# Patient Record
Sex: FEMALE | Race: WHITE | NOT HISPANIC OR LATINO | URBAN - METROPOLITAN AREA
[De-identification: names, ages, dates, MRNs, and addresses within clinical notes are randomized per-mention and may not be internally consistent; named-entity substitution may affect disease eponyms.]

---

## 2017-03-10 ENCOUNTER — OUTPATIENT (OUTPATIENT)
Dept: OUTPATIENT SERVICES | Facility: HOSPITAL | Age: 67
LOS: 1 days | Discharge: HOME | End: 2017-03-10

## 2017-06-27 DIAGNOSIS — Z12.31 ENCOUNTER FOR SCREENING MAMMOGRAM FOR MALIGNANT NEOPLASM OF BREAST: ICD-10-CM

## 2018-06-20 ENCOUNTER — OUTPATIENT (OUTPATIENT)
Dept: OUTPATIENT SERVICES | Facility: HOSPITAL | Age: 68
LOS: 1 days | Discharge: HOME | End: 2018-06-20

## 2018-06-20 DIAGNOSIS — Z12.31 ENCOUNTER FOR SCREENING MAMMOGRAM FOR MALIGNANT NEOPLASM OF BREAST: ICD-10-CM

## 2018-06-21 DIAGNOSIS — Z13.820 ENCOUNTER FOR SCREENING FOR OSTEOPOROSIS: ICD-10-CM

## 2018-06-21 DIAGNOSIS — Z82.62 FAMILY HISTORY OF OSTEOPOROSIS: ICD-10-CM

## 2018-06-21 DIAGNOSIS — N95.9 UNSPECIFIED MENOPAUSAL AND PERIMENOPAUSAL DISORDER: ICD-10-CM

## 2019-06-24 ENCOUNTER — OUTPATIENT (OUTPATIENT)
Dept: OUTPATIENT SERVICES | Facility: HOSPITAL | Age: 69
LOS: 1 days | Discharge: HOME | End: 2019-06-24
Payer: MEDICARE

## 2019-06-24 DIAGNOSIS — Z12.31 ENCOUNTER FOR SCREENING MAMMOGRAM FOR MALIGNANT NEOPLASM OF BREAST: ICD-10-CM

## 2019-06-24 PROCEDURE — 77067 SCR MAMMO BI INCL CAD: CPT | Mod: 26

## 2019-06-24 PROCEDURE — 77063 BREAST TOMOSYNTHESIS BI: CPT | Mod: 26

## 2020-11-18 ENCOUNTER — OUTPATIENT (OUTPATIENT)
Dept: OUTPATIENT SERVICES | Facility: HOSPITAL | Age: 70
LOS: 1 days | Discharge: HOME | End: 2020-11-18
Payer: MEDICARE

## 2020-11-18 DIAGNOSIS — Z12.31 ENCOUNTER FOR SCREENING MAMMOGRAM FOR MALIGNANT NEOPLASM OF BREAST: ICD-10-CM

## 2020-11-18 PROCEDURE — 77067 SCR MAMMO BI INCL CAD: CPT | Mod: 26

## 2020-11-18 PROCEDURE — 77063 BREAST TOMOSYNTHESIS BI: CPT | Mod: 26

## 2021-06-21 ENCOUNTER — INPATIENT (INPATIENT)
Facility: HOSPITAL | Age: 71
LOS: 1 days | Discharge: HOME | End: 2021-06-23
Attending: INTERNAL MEDICINE | Admitting: INTERNAL MEDICINE
Payer: MEDICARE

## 2021-06-21 VITALS
SYSTOLIC BLOOD PRESSURE: 161 MMHG | TEMPERATURE: 97 F | DIASTOLIC BLOOD PRESSURE: 87 MMHG | OXYGEN SATURATION: 97 % | RESPIRATION RATE: 18 BRPM | HEART RATE: 60 BPM

## 2021-06-21 LAB
ALBUMIN SERPL ELPH-MCNC: 4.3 G/DL — SIGNIFICANT CHANGE UP (ref 3.5–5.2)
ALP SERPL-CCNC: 95 U/L — SIGNIFICANT CHANGE UP (ref 30–115)
ALT FLD-CCNC: 18 U/L — SIGNIFICANT CHANGE UP (ref 0–41)
ANION GAP SERPL CALC-SCNC: 10 MMOL/L — SIGNIFICANT CHANGE UP (ref 7–14)
APTT BLD: 30 SEC — SIGNIFICANT CHANGE UP (ref 27–39.2)
AST SERPL-CCNC: 20 U/L — SIGNIFICANT CHANGE UP (ref 0–41)
BASOPHILS # BLD AUTO: 0.04 K/UL — SIGNIFICANT CHANGE UP (ref 0–0.2)
BASOPHILS NFR BLD AUTO: 0.4 % — SIGNIFICANT CHANGE UP (ref 0–1)
BILIRUB SERPL-MCNC: 0.5 MG/DL — SIGNIFICANT CHANGE UP (ref 0.2–1.2)
BUN SERPL-MCNC: 12 MG/DL — SIGNIFICANT CHANGE UP (ref 10–20)
CALCIUM SERPL-MCNC: 9.1 MG/DL — SIGNIFICANT CHANGE UP (ref 8.5–10.1)
CHLORIDE SERPL-SCNC: 103 MMOL/L — SIGNIFICANT CHANGE UP (ref 98–110)
CO2 SERPL-SCNC: 24 MMOL/L — SIGNIFICANT CHANGE UP (ref 17–32)
CREAT SERPL-MCNC: 0.6 MG/DL — LOW (ref 0.7–1.5)
EOSINOPHIL # BLD AUTO: 0.11 K/UL — SIGNIFICANT CHANGE UP (ref 0–0.7)
EOSINOPHIL NFR BLD AUTO: 1.1 % — SIGNIFICANT CHANGE UP (ref 0–8)
GLUCOSE SERPL-MCNC: 105 MG/DL — HIGH (ref 70–99)
HCT VFR BLD CALC: 41.3 % — SIGNIFICANT CHANGE UP (ref 37–47)
HGB BLD-MCNC: 13.7 G/DL — SIGNIFICANT CHANGE UP (ref 12–16)
IMM GRANULOCYTES NFR BLD AUTO: 0.7 % — HIGH (ref 0.1–0.3)
INR BLD: 0.96 RATIO — SIGNIFICANT CHANGE UP (ref 0.65–1.3)
LYMPHOCYTES # BLD AUTO: 1.74 K/UL — SIGNIFICANT CHANGE UP (ref 1.2–3.4)
LYMPHOCYTES # BLD AUTO: 17.2 % — LOW (ref 20.5–51.1)
MCHC RBC-ENTMCNC: 31.1 PG — HIGH (ref 27–31)
MCHC RBC-ENTMCNC: 33.2 G/DL — SIGNIFICANT CHANGE UP (ref 32–37)
MCV RBC AUTO: 93.7 FL — SIGNIFICANT CHANGE UP (ref 81–99)
MONOCYTES # BLD AUTO: 0.75 K/UL — HIGH (ref 0.1–0.6)
MONOCYTES NFR BLD AUTO: 7.4 % — SIGNIFICANT CHANGE UP (ref 1.7–9.3)
NEUTROPHILS # BLD AUTO: 7.42 K/UL — HIGH (ref 1.4–6.5)
NEUTROPHILS NFR BLD AUTO: 73.2 % — SIGNIFICANT CHANGE UP (ref 42.2–75.2)
NRBC # BLD: 0 /100 WBCS — SIGNIFICANT CHANGE UP (ref 0–0)
NT-PROBNP SERPL-SCNC: 43 PG/ML — SIGNIFICANT CHANGE UP (ref 0–300)
PLATELET # BLD AUTO: 349 K/UL — SIGNIFICANT CHANGE UP (ref 130–400)
POTASSIUM SERPL-MCNC: 3.9 MMOL/L — SIGNIFICANT CHANGE UP (ref 3.5–5)
POTASSIUM SERPL-SCNC: 3.9 MMOL/L — SIGNIFICANT CHANGE UP (ref 3.5–5)
PROT SERPL-MCNC: 6.7 G/DL — SIGNIFICANT CHANGE UP (ref 6–8)
PROTHROM AB SERPL-ACNC: 11 SEC — SIGNIFICANT CHANGE UP (ref 9.95–12.87)
RBC # BLD: 4.41 M/UL — SIGNIFICANT CHANGE UP (ref 4.2–5.4)
RBC # FLD: 13.1 % — SIGNIFICANT CHANGE UP (ref 11.5–14.5)
SARS-COV-2 RNA SPEC QL NAA+PROBE: SIGNIFICANT CHANGE UP
SODIUM SERPL-SCNC: 137 MMOL/L — SIGNIFICANT CHANGE UP (ref 135–146)
TROPONIN T SERPL-MCNC: <0.01 NG/ML — SIGNIFICANT CHANGE UP
WBC # BLD: 10.13 K/UL — SIGNIFICANT CHANGE UP (ref 4.8–10.8)
WBC # FLD AUTO: 10.13 K/UL — SIGNIFICANT CHANGE UP (ref 4.8–10.8)

## 2021-06-21 PROCEDURE — 99223 1ST HOSP IP/OBS HIGH 75: CPT

## 2021-06-21 PROCEDURE — 93010 ELECTROCARDIOGRAM REPORT: CPT

## 2021-06-21 PROCEDURE — 99285 EMERGENCY DEPT VISIT HI MDM: CPT

## 2021-06-21 PROCEDURE — 70496 CT ANGIOGRAPHY HEAD: CPT | Mod: 26,MA

## 2021-06-21 PROCEDURE — 99222 1ST HOSP IP/OBS MODERATE 55: CPT

## 2021-06-21 PROCEDURE — 71045 X-RAY EXAM CHEST 1 VIEW: CPT | Mod: 26

## 2021-06-21 PROCEDURE — 70450 CT HEAD/BRAIN W/O DYE: CPT | Mod: 26,MA,59

## 2021-06-21 PROCEDURE — 70498 CT ANGIOGRAPHY NECK: CPT | Mod: 26,MA

## 2021-06-21 RX ORDER — ASPIRIN/CALCIUM CARB/MAGNESIUM 324 MG
81 TABLET ORAL DAILY
Refills: 0 | Status: DISCONTINUED | OUTPATIENT
Start: 2021-06-21 | End: 2021-06-23

## 2021-06-21 RX ORDER — ONDANSETRON 8 MG/1
4 TABLET, FILM COATED ORAL ONCE
Refills: 0 | Status: COMPLETED | OUTPATIENT
Start: 2021-06-21 | End: 2021-06-21

## 2021-06-21 RX ORDER — ATORVASTATIN CALCIUM 80 MG/1
80 TABLET, FILM COATED ORAL AT BEDTIME
Refills: 0 | Status: DISCONTINUED | OUTPATIENT
Start: 2021-06-21 | End: 2021-06-23

## 2021-06-21 RX ORDER — ASPIRIN/CALCIUM CARB/MAGNESIUM 324 MG
325 TABLET ORAL ONCE
Refills: 0 | Status: COMPLETED | OUTPATIENT
Start: 2021-06-21 | End: 2021-06-21

## 2021-06-21 RX ORDER — MECLIZINE HCL 12.5 MG
25 TABLET ORAL EVERY 8 HOURS
Refills: 0 | Status: DISCONTINUED | OUTPATIENT
Start: 2021-06-21 | End: 2021-06-23

## 2021-06-21 RX ORDER — ENOXAPARIN SODIUM 100 MG/ML
40 INJECTION SUBCUTANEOUS DAILY
Refills: 0 | Status: DISCONTINUED | OUTPATIENT
Start: 2021-06-21 | End: 2021-06-23

## 2021-06-21 RX ORDER — CHLORHEXIDINE GLUCONATE 213 G/1000ML
1 SOLUTION TOPICAL
Refills: 0 | Status: DISCONTINUED | OUTPATIENT
Start: 2021-06-21 | End: 2021-06-23

## 2021-06-21 RX ORDER — MECLIZINE HCL 12.5 MG
25 TABLET ORAL ONCE
Refills: 0 | Status: COMPLETED | OUTPATIENT
Start: 2021-06-21 | End: 2021-06-21

## 2021-06-21 RX ADMIN — Medication 25 MILLIGRAM(S): at 22:21

## 2021-06-21 RX ADMIN — ONDANSETRON 4 MILLIGRAM(S): 8 TABLET, FILM COATED ORAL at 11:36

## 2021-06-21 RX ADMIN — Medication 325 MILLIGRAM(S): at 12:28

## 2021-06-21 RX ADMIN — Medication 25 MILLIGRAM(S): at 12:28

## 2021-06-21 RX ADMIN — ENOXAPARIN SODIUM 40 MILLIGRAM(S): 100 INJECTION SUBCUTANEOUS at 22:21

## 2021-06-21 NOTE — ED PROVIDER NOTE - CLINICAL SUMMARY MEDICAL DECISION MAKING FREE TEXT BOX
Patient presents with room spinning dizziness and nausea. labs, ekg, cxr, ct, cta done. Symptoms improved while in the ED. CTA shows possible occlusion versus artifact, results discussed with patient. Seen by neurology who recommends stroke unit admission. Patient given meclizine and asprin. Admitted for further management.

## 2021-06-21 NOTE — ED PROVIDER NOTE - PHYSICAL EXAMINATION
CONSTITUTIONAL: Well-developed; well-nourished; in no acute distress.   SKIN: warm, dry  HEAD: Normocephalic; atraumatic.  EYES: PERRL, EOMI, no conjunctival erythema  ENT: No nasal discharge; airway clear.  NECK: Supple; non tender.  CARD: S1, S2 normal;  Regular rate and rhythm.   RESP: No wheezes, rales or rhonchi.  ABD: soft non tender, non distended, no rebound or guarding  EXT: Normal ROM.  5/5 strength in all 4 extremities   LYMPH: No acute cervical adenopathy.  NEURO: A&Ox3, CN 2-11 intact, moving all extremities, 5/5 strength, equal sensation bilaterally, normal finger to nose exam.   PSYCH: Cooperative, appropriate.

## 2021-06-21 NOTE — ED PROVIDER NOTE - PROGRESS NOTE DETAILS
Discussed with yash from neurology, will come to evaluate the patient Patient seen by neurology, recommending CTA. patient feeling better, dizziness improved. Able to walk to the bathroom without support.

## 2021-06-21 NOTE — H&P ADULT - ATTENDING COMMENTS
71 YO F with a PMH of HLD who was BIBEMS for eval of dizziness (room-spinning) that started upon waking this AM, lasting for 15 minutes. Associated with left-sided weakness/numbness. The pt denies any visual changes, or headache. ROS negative, except as stated above.     In the ED, CTH was negative for acute changes. CTP/CTA showed possible occlusion of V2 segment of vertebral artery vs artifact. No tPA administered due to pt being out of the window. Neuro consulted and recommended possible MRI, echo, secondary stroke PPX (ASA/Statin), and neurochecks.     FMHx: Reviewed, not relevant    Physical exam showed pt in NAD. VSS, afebrile, not hypoxic on RA. A&Ox3. 5/5 strength in RUE/RLE/LUE/LLE, otherwise negative. No sensation changes. CNs are intact. No dysarthria. CTA B/L. RRR, no M/G/R. ABD is soft and non-tender. LEs without swelling. Labs and radiology as above.     Dizziness + Left-sided weakness/numbness, suspect TIA. Neuro is following. Secondary stroke PPX (ASA/Statin). MRI/A. Echo. A1c, lipids, and TSH. PT/ consult. Fall precautions.     Hx of HLD. Restart home meds, except as stated above. DVT PPX. Inform PCP of pt's admission to hospital. My note supersedes the residents note.

## 2021-06-21 NOTE — ED PROVIDER NOTE - OBJECTIVE STATEMENT
71 yo F pmh of HLD presents with dizziness. States that this morning she woke up feeling off, reports having cold sweats. Was able to walk to the bathroom. Afterwards states that she felt worse, had nausea and room spinning dizziness. Also describes that he left arm felt tingling, no weakness or numbness. no headache, was able to walk but felt unsteady. no chest pain, no shortness of breath, no palpitations. no similar episodes in the past.

## 2021-06-21 NOTE — H&P ADULT - ASSESSMENT
#Dizziness, lightheadedness, left sided weakness/numbness  - CTH no acute pathology  - CT angio head and neck shows possible occlusion in V2 segment in the right vertebral artery  - obtain MRI Head and MRA of the neck  - Aspirin and statin  - Check LDL, hgba1c  - ECHO  - Meclizine PRN

## 2021-06-21 NOTE — H&P ADULT - HISTORY OF PRESENT ILLNESS
Ms. Montalvo is a 71yo woman with PMHX below presenting with vertigo upon awakening this morning.  She had severe nausea and vomiting and also a transient episode of left arm and leg tingling and difficulty speaking last about 10-15 minutes.  She feels slihghtly better since coming to ED but has not moved since laying in bed.  No previous episodes similar to this.  NO hearing loss or tinnitus. No change in medications.  No diplopia, dysarhtia or dysphagia 69 y/o F w/ PMHx of HLD presents with dizziness, left sided numbness and weakness. Patient woke up this morning with dizziness and sensation of room spinning. She tried to get up and walk to the washroom however she was unable to stand properly. She called her  and was trying to talk to him however reported having difficulty with speech. Additionally she endorsed left sided numbness/tingling along with weakness. Symptoms lasted roughly 15 minutes and currently in the ED she reports feeling back to normal.

## 2021-06-21 NOTE — ED PROVIDER NOTE - NS ED ROS FT
Eyes:  No visual changes, eye pain or discharge.  ENMT:  No hearing changes, pain, no sore throat or runny nose, no difficulty swallowing  Cardiac:  No chest pain, SOB or edema. No chest pain with exertion.  Respiratory:  No cough or respiratory distress.    GI:  + nausea no vomiting, diarrhea or abdominal pain.  :  No dysuria, frequency or burning.  MS:  No myalgia, muscle weakness, joint pain or back pain.  Neuro:  No headache or weakness.  No LOC. + room spinning dizziness, Episode of left arm and left tingling sensation this morning that has resolved. no weakness, no numbness.   Skin:  No skin rash.   Endocrine: No history of thyroid disease or diabetes.

## 2021-06-21 NOTE — H&P ADULT - NSHPLABSRESULTS_GEN_ALL_CORE
13.7   10.13 )-----------( 349      ( 21 Jun 2021 10:09 )             41.3       06-21    137  |  103  |  12  ----------------------------<  105<H>  3.9   |  24  |  0.6<L>    Ca    9.1      21 Jun 2021 10:09    TPro  6.7  /  Alb  4.3  /  TBili  0.5  /  DBili  x   /  AST  20  /  ALT  18  /  AlkPhos  95  06-21                  PT/INR - ( 21 Jun 2021 10:09 )   PT: 11.00 sec;   INR: 0.96 ratio         PTT - ( 21 Jun 2021 10:09 )  PTT:30.0 sec    Lactate Trend      CARDIAC MARKERS ( 21 Jun 2021 10:09 )  x     / <0.01 ng/mL / x     / x     / x            CAPILLARY BLOOD GLUCOSE      POCT Blood Glucose.: 117 mg/dL (21 Jun 2021 09:20)        < from: CT Angio Head w/ IV Cont (06.21.21 @ 13:09) >    IMPRESSION:    Focal loss of vascular enhancements in the V2 segment of the right vertebral artery (at the level of C3) could be artifactual from streak artifact, however focal occlusion cannot be completely excluded. Further evaluation with MRA of the neck can be obtained as indicated.    No carotid stenosis or occlusion.    < end of copied text >    < from: CT Head No Cont (06.21.21 @ 10:42) >    IMPRESSION:    No CT evidence of acute intracranial pathology.    < end of copied text >

## 2021-06-21 NOTE — ED ADULT NURSE NOTE - NSIMPLEMENTINTERV_GEN_ALL_ED
Implemented All Fall Risk Interventions:  [place] to call system. Call bell, personal items and telephone within reach. Instruct patient to call for assistance. Room bathroom lighting operational. Non-slip footwear when patient is off stretcher. Physically safe environment: no spills, clutter or unnecessary equipment. Stretcher in lowest position, wheels locked, appropriate side rails in place. Provide visual cue, wrist band, yellow gown, etc. Monitor gait and stability. Monitor for mental status changes and reorient to person, place, and time. Review medications for side effects contributing to fall risk. Reinforce activity limits and safety measures with patient and family.

## 2021-06-21 NOTE — ED ADULT NURSE NOTE - OBJECTIVE STATEMENT
Pt had 1 episode of n/v at home then c/o dizziness. Pt states "room was spinning" and she had to lower herself to grown.

## 2021-06-22 LAB
A1C WITH ESTIMATED AVERAGE GLUCOSE RESULT: 5.2 % — SIGNIFICANT CHANGE UP (ref 4–5.6)
CHOLEST SERPL-MCNC: 164 MG/DL — SIGNIFICANT CHANGE UP
ESTIMATED AVERAGE GLUCOSE: 103 MG/DL — SIGNIFICANT CHANGE UP (ref 68–114)
HCV AB S/CO SERPL IA: 0.04 COI — SIGNIFICANT CHANGE UP
HCV AB SERPL-IMP: SIGNIFICANT CHANGE UP
HDLC SERPL-MCNC: 43 MG/DL — LOW
LIPID PNL WITH DIRECT LDL SERPL: 102 MG/DL — HIGH
NON HDL CHOLESTEROL: 121 MG/DL — SIGNIFICANT CHANGE UP
TRIGL SERPL-MCNC: 125 MG/DL — SIGNIFICANT CHANGE UP

## 2021-06-22 PROCEDURE — 70547 MR ANGIOGRAPHY NECK W/O DYE: CPT | Mod: 26

## 2021-06-22 PROCEDURE — 70551 MRI BRAIN STEM W/O DYE: CPT | Mod: 26

## 2021-06-22 RX ADMIN — ATORVASTATIN CALCIUM 80 MILLIGRAM(S): 80 TABLET, FILM COATED ORAL at 21:23

## 2021-06-22 RX ADMIN — CHLORHEXIDINE GLUCONATE 1 APPLICATION(S): 213 SOLUTION TOPICAL at 05:33

## 2021-06-22 RX ADMIN — ENOXAPARIN SODIUM 40 MILLIGRAM(S): 100 INJECTION SUBCUTANEOUS at 11:19

## 2021-06-22 RX ADMIN — Medication 81 MILLIGRAM(S): at 11:19

## 2021-06-22 RX ADMIN — ATORVASTATIN CALCIUM 80 MILLIGRAM(S): 80 TABLET, FILM COATED ORAL at 00:26

## 2021-06-22 NOTE — SWALLOW BEDSIDE ASSESSMENT ADULT - NS SPL SWALLOW CLINIC TRIAL FT
jayme-pharyngeal swallow is WFL for thin, puree and regular solids w/o overt s/s of penetration/aspiration

## 2021-06-22 NOTE — PHYSICAL THERAPY INITIAL EVALUATION ADULT - GENERAL OBSERVATIONS, REHAB EVAL
11:00-11:27 Pt encountered seated at EOB in NAD, being downgraded from stroke unit to room on unit, tele d/cd. No c/o offered at this time, pt reports feeling baseline

## 2021-06-22 NOTE — OCCUPATIONAL THERAPY INITIAL EVALUATION ADULT - VISUAL ASSESSMENT: TRACKING
mild nystagmus present during initial tracking to left lower quadrant which resolved on 2nd trial. Pt wears glasses for reading and distance./normal

## 2021-06-22 NOTE — OCCUPATIONAL THERAPY INITIAL EVALUATION ADULT - GENERAL OBSERVATIONS, REHAB EVAL
OT eval: 13:10-13:35 Pt received seated in b/s chair in [facility]. Pt pleasant and agreeable to OT eval.

## 2021-06-22 NOTE — CONSULT NOTE ADULT - SUBJECTIVE AND OBJECTIVE BOX
ERASTO MONTALVO     70y     Female    [id number]                                                           CC:Patient is a 70y old  Female who presents with a chief complaint of vertigo    HPI: Ms. Montalvo is a 71yo woman with PMHX below presenting with vertigo upon awakening this morning.  She had severe nausea and vomiting and also a transient episode of left arm and leg tingling and difficulty speaking last about 10-15 minutes.  She feels slihghtly better since coming to ED but has not moved since laying in bed.  No previous episodes similar to this.  NO hearing loss or tinnitus. No change in medications.  No diplopia, dysarhtia or dysphagia        ROS:  Constitutional, Neurological, Psychiatric, Eyes, ENT, Cardiovascular, Respiratory, Gastrointestinal, Genitourinary, Musculoskeletal, Integumentary, Endocrine and Heme/Lymph are otherwise negative. Except for noted above    Social History: No smoking, No drinking, No drug use    FAMILY HISTORY: father had stroke in 70's                Vital Signs Last 24 Hrs  T(C): 36.1 (21 Jun 2021 08:49), Max: 36.1 (21 Jun 2021 08:49)  T(F): 97 (21 Jun 2021 08:49), Max: 97 (21 Jun 2021 08:49)  HR: 60 (21 Jun 2021 09:27) (60 - 60)  BP: 136/66 (21 Jun 2021 09:27) (136/66 - 161/87)  BP(mean): --  RR: 13 (21 Jun 2021 09:27) (13 - 18)  SpO2: 97% (21 Jun 2021 09:27) (97% - 97%)    Physical Exam:  Constitutional: alert and in no acute distress.  Eyes: the sclera and conjunctiva were normal, pupils were equal in size, round, reactive to light, with normal accommodation and extraocular movements were intact.   Back: no costovertebral angle tenderness and no spinal tenderness.      Neuro Exam:  Orientation: oriented to person, oriented to place and oriented to time.   Attention: normal concentrating ability and visual attention was not decreased.   Language: no difficulty naming common objects, no difficulty repeating a phrase, no difficulty writing a sentence, fluency intact, comprehension intact and reading intact.   Fund of knowledge: displays adequate knowledge of personal past history.   Cranial Nerves: visual fields full to confrontation, pupils equal round and reactive to light, extraocular motion intact, facial sensation intact symmetrically, face symmetrical, hearing was intact bilaterally, tongue and palate midline, head turning and shoulder shrug symmetric and there was no tongue deviation with protrusion.   Motor: muscle tone was normal in all four extremities, muscle strength was normal in all four extremities and normal bulk in all four extremities.   Sensory exam: light touch was intact.   Coordination:. Unable to test gait as sitting produces dizziness and nausea.  there was no past-pointing. no tremor present.   Deep tendon reflexes:   Trace in UE and LE b/l  Plantar responses normal on the right, up on the left.    Unable to perform danika-jackelyn pike as sitting with head midline produces symptoms.    NIHSS: 0    Allergies    No Known Allergies    Intolerances       Home Medications:  not available        LABS:  Pending                Neuro Imaging:  NCHCT: Pending        Assessment / Plan: This is a 70y year old Female presenting with vertigo since this morning with left sided numbness and some difficulty getting words out last about 10-15 minutes.    She has some findings suggesting a central cause for symptoms.  1. CTH and CTA H+N  2. Depending on results of CTA will make disposition however would admit for MRI brain w/o OPAL  3. Aspirin and statin  4. Check LDL, hgba1c  5. ECHO  6. Meclizine PRN                
T(C): 36.3 (06-22-21 @ 13:48), Max: 36.5 (06-21-21 @ 22:10)  HR: 63 (06-22-21 @ 13:48) (58 - 69)  BP: 115/70 (06-22-21 @ 13:48) (101/51 - 121/71)  RR: 18 (06-22-21 @ 13:48) (18 - 18)  SpO2: 98% (06-22-21 @ 10:10) (96% - 99%)    MOTOR EXAM      Physical Medicine And Rehabilitation Services are not indicated in this patient for the following Reason(s):    [    ] Patient is medically unstable      [    ]  Patient does not have appropriate activity orders      [     ] Patient has no weight bearing status for:      [ xx    ] Patient is independently ambulating - back to baseline per patient. Fully independent per PT. MRI negative      [     ]  Patient is from Skilled Nursing Facility and is appropriate to return      [     ] Patient was non-ambulatory prior to admission      [     ]  Other      [facility] PM&R / PT request

## 2021-06-22 NOTE — OCCUPATIONAL THERAPY INITIAL EVALUATION ADULT - PERTINENT HX OF CURRENT PROBLEM, REHAB EVAL
pt is a 69 y/o, R hand dominant, woman w/ PMHx of HLD presents with dizziness, left sided numbness and weakness. Patient woke up this morning with dizziness and sensation of room spinning.

## 2021-06-22 NOTE — SWALLOW BEDSIDE ASSESSMENT ADULT - SLP GENERAL OBSERVATIONS
Pt received in bed awake and alert on room air, pt reports her speech and language is back to baseline and w/ no c/o dysphagia

## 2021-06-22 NOTE — SWALLOW BEDSIDE ASSESSMENT ADULT - SLP PERTINENT HISTORY OF CURRENT PROBLEM
Pt admitted w/ vertigo, L sided numbness and aphasia which reportedly lasted 10-15 min. CTH (-), CTA head/neck ? occlusion of V2 segment of R vertebral artery. Speech, language and cognition is at baseline

## 2021-06-22 NOTE — SWALLOW BEDSIDE ASSESSMENT ADULT - SWALLOW EVAL: DIAGNOSIS
+jayme-pharyngeal swallow is WFL for thin, puree and regular solids w/o overt s/s of penetration/aspiration

## 2021-06-22 NOTE — OCCUPATIONAL THERAPY INITIAL EVALUATION ADULT - REHAB POTENTIAL, OT EVAL
Pt presents as independent with Activities of daily living and transfers and does not require further OT at this time. Please reconsult if any changes present. worse when voiding

## 2021-06-22 NOTE — PROGRESS NOTE ADULT - ASSESSMENT
70F presenting with vertigo since this morning associated with left sided numbness/weakness and difficulty getting words out. Symptoms lasted about 10-15 minutes followed by complete resolution.  Findings suggestive of a central etiology.      Dizziness, lightheadedness, left sided weakness/numbness  - CTH (6/21): no acute pathology  - CTA head/neck (6/21):  shows possible occlusion in V2 segment in the right vertebral artery  - Obtain MRI Head and MRA of the neck (ordered)  - Start aspirin and statin  - Check LDL, HgbA1c  - Echo (ordered)  - Meclizine PRN      Misc  - Diet: DASH  - Activity: IAT  - DVT Prophylaxis: Lovenox 40mg daily  - GI Prophylaxis: none  - Code Status: full code  - Disposition: acute   70F presenting with vertigo since this morning associated with left sided numbness/weakness and difficulty getting words out. Symptoms lasted about 10-15 minutes followed by complete resolution.  Findings suggestive of a central etiology.      Dizziness, lightheadedness, left sided weakness/numbness  - CTH (6/21): no acute pathology  - CTA head/neck (6/21):  shows possible occlusion in V2 segment in the right vertebral artery  - F/u MRI Head and MRA of the neck (ordered)  - Symptoms resolved  - C/w aspirin and statin  - Check , HgbA1c 5.2  - F/u Echo (ordered)  - Meclizine PRN      Misc  - Diet: DASH  - Activity: IAT  - DVT Prophylaxis: Lovenox 40mg daily  - GI Prophylaxis: none  - Code Status: full code  - Disposition: acute, pending MRI and TTE

## 2021-06-23 ENCOUNTER — TRANSCRIPTION ENCOUNTER (OUTPATIENT)
Age: 71
End: 2021-06-23

## 2021-06-23 VITALS — WEIGHT: 158.07 LBS

## 2021-06-23 LAB
COVID-19 SPIKE DOMAIN AB INTERP: POSITIVE
COVID-19 SPIKE DOMAIN ANTIBODY RESULT: >250 U/ML — HIGH
SARS-COV-2 IGG+IGM SERPL QL IA: >250 U/ML — HIGH
SARS-COV-2 IGG+IGM SERPL QL IA: POSITIVE

## 2021-06-23 PROCEDURE — 93306 TTE W/DOPPLER COMPLETE: CPT | Mod: 26

## 2021-06-23 PROCEDURE — 99239 HOSP IP/OBS DSCHRG MGMT >30: CPT

## 2021-06-23 RX ORDER — ATORVASTATIN CALCIUM 80 MG/1
1 TABLET, FILM COATED ORAL
Qty: 30 | Refills: 0
Start: 2021-06-23 | End: 2021-07-22

## 2021-06-23 RX ORDER — MECLIZINE HCL 12.5 MG
1 TABLET ORAL
Qty: 15 | Refills: 0
Start: 2021-06-23

## 2021-06-23 RX ORDER — ATORVASTATIN CALCIUM 80 MG/1
1 TABLET, FILM COATED ORAL
Qty: 0 | Refills: 0 | DISCHARGE

## 2021-06-23 RX ORDER — ASPIRIN/CALCIUM CARB/MAGNESIUM 324 MG
1 TABLET ORAL
Qty: 30 | Refills: 0
Start: 2021-06-23 | End: 2021-07-22

## 2021-06-23 RX ADMIN — CHLORHEXIDINE GLUCONATE 1 APPLICATION(S): 213 SOLUTION TOPICAL at 05:21

## 2021-06-23 RX ADMIN — Medication 81 MILLIGRAM(S): at 12:26

## 2021-06-23 NOTE — DISCHARGE NOTE PROVIDER - HOSPITAL COURSE
70F w/ PMHx of HLD presents with dizziness, left sided numbness and weakness. Patient woke up this morning with dizziness and sensation of room spinning. She tried to get up and walk to the washroom however she was unable to stand properly. She called her  and was trying to talk to him however reported having difficulty with speech. Additionally she endorsed left sided numbness/tingling along with weakness. Symptoms lasted roughly 15 minutes and currently in the ED she reports feeling back to normal.     Pt was admitted for Vertigo, possibly central in etiology given focal exam.  Pt was evaluated with CT and CTA. CTA showed possible occlusion in V2 segment of right vertebral. MRI was subsequently ordered and was negative. Pt was started on ASA and statin and was cleared by Neurology for discharge      Itemized hospital course;      Dizziness, lightheadedness, left sided weakness/numbness  - CTH (6/21): no acute pathology  - CTA head/neck (6/21):  shows possible occlusion in V2 segment in the right vertebral artery  - MRI negative  - Symptoms resolved  - C/w aspirin and statin  - Check , HgbA1c 5.2  - Echo  - Meclizine PRN

## 2021-06-23 NOTE — DISCHARGE NOTE PROVIDER - CARE PROVIDER_API CALL
Medardo Miner)  EEGEpilepsy; Neurology  [facility] [facility] [address], [address]  [place] [address]  Phone: [phone]  Fax: [phone]  Follow Up Time: 2 weeks    Sabiha Azul  INTERNAL MEDICINE  [facility] [place] [address]  Phone: [phone]  Fax: [phone]  Follow Up Time: 2 weeks   Medardo Miner)  EEGEpilepsy; Neurology  [facility] [facility], [address]  [place] [address]  Phone: [phone]  Fax: [phone]  Follow Up Time: 2 weeks    Sabiha Azul  INTERNAL MEDICINE  [facility] [place] [address]  Phone: [phone]  Fax: [phone]  Follow Up Time: 1 week

## 2021-06-23 NOTE — DISCHARGE NOTE PROVIDER - NSDCCPCAREPLAN_GEN_ALL_CORE_FT
PRINCIPAL DISCHARGE DIAGNOSIS  Diagnosis: Vertigo  Assessment and Plan of Treatment: Vertigo  Vertigo means that you feel like you are moving when you are not. Vertigo can also make you feel like things around you are moving when they are not. This feeling can come and go at any time. Vertigo often goes away on its own.  Follow these instructions at home:  Avoid making fast movements.  Avoid driving.  Avoid using heavy machinery.  Avoid doing any task or activity that might cause danger to you or other people if you would have a vertigo attack while you are doing it.  Sit down right away if you feel dizzy or have trouble with your balance.  Take over-the-counter and prescription medicines only as told by your doctor.  Follow instructions from your doctor about which positions or movements you should avoid.  Drink enough fluid to keep your pee (urine) clear or pale yellow.  Keep all follow-up visits as told by your doctor. This is important.  Contact a doctor if:  Medicine does not help your vertigo.  You have a fever.  Your problems get worse or you have new symptoms.  Your family or friends see changes in your behavior.  You feel sick to your stomach (nauseous) or you throw up (vomit).  You have a “pins and needles” feeling or you are numb in part of your body.  Get help right away if:  You have trouble moving or talking.  You are always dizzy.  You pass out (faint).  You get very bad headaches.  You feel weak or have trouble using your hands, arms, or legs.  You have changes in your hearing.  You have changes in your seeing (vision).  You get a stiff neck.  Bright light starts to bother you.  This information is not intended to replace advice given to you by your health care provider. Make sure you discuss any questions you have with your health care provider.       PRINCIPAL DISCHARGE DIAGNOSIS  Diagnosis: Transient ischemic attack (TIA)  Assessment and Plan of Treatment: You presented with symptoms suggestive of transient ischemic attack. MRI was negative for acute stroke. We recommend you start taking baby aspiring daily and high dose of Lipitor (high dose only for a short period of time). Please follow up with neurology as outpt

## 2021-06-23 NOTE — DISCHARGE NOTE PROVIDER - PROVIDER TOKENS
PROVIDER:[TOKEN:[[id number]],FOLLOWUP:[2 weeks]],PROVIDER:[TOKEN:[[id number]],FOLLOWUP:[2 weeks]] PROVIDER:[TOKEN:[[id number]],FOLLOWUP:[2 weeks]],PROVIDER:[TOKEN:[[id number]],FOLLOWUP:[1 week]]

## 2021-06-23 NOTE — DISCHARGE NOTE NURSING/CASE MANAGEMENT/SOCIAL WORK - PATIENT PORTAL LINK FT
You can access the FollowMyHealth Patient Portal offered by [facility] by registering at the following website: [email or web address]. By joining [email or web address]’s FollowMyHealth portal, you will also be able to view your health information using other applications (apps) compatible with our system.

## 2021-06-23 NOTE — PROGRESS NOTE ADULT - ASSESSMENT
1F presenting with vertigo since this morning associated with left sided numbness/weakness and difficulty getting words out. Symptoms lasted about 10-15 minutes followed by complete resolution.      Suspected TIA  - CTH (6/21): no acute pathology  -MRI negative  - Symptoms resolved  - C/w aspirin and statin  - Check , HgbA1c 5.2  - F/u Echo (ordered)  - Meclizine PRN    #HLD: statin    Dispo: d/w neuro Dr. Zamarripa, home if TTE negative    Discharge instructions discussed and patient knows when to seek immediate medical attention.  Patient has proper follow up.  All results discussed and patient aware they require further follow up/work up.  Stressed importance of proper follow up.  Medications prescribed and changes discussed.  All questions and concerns from patient  addressed. Understanding of instructions verbalized.    Time spent in completing discharge process and coordinating care 45 minutes.    Discussed with housestaff, nursing, case mgmt, neuro Dr. Zamarripa

## 2021-06-23 NOTE — DISCHARGE NOTE PROVIDER - NSDCMRMEDTOKEN_GEN_ALL_CORE_FT
meclizine 25 mg oral tablet: 1 tab(s) orally every 8 hours, As needed, Dizziness   aspirin 81 mg oral tablet, chewable: 1 tab(s) orally once a day  atorvastatin 80 mg oral tablet: 1 tab(s) orally once a day (at bedtime)  meclizine 25 mg oral tablet: 1 tab(s) orally every 8 hours, As needed, Dizziness

## 2021-06-23 NOTE — PROGRESS NOTE ADULT - SUBJECTIVE AND OBJECTIVE BOX
Patient is a 70y old Female who presents with a chief complaint of   No acute events overnight. Patient has no complaints this morning. Denies chest pain, SOB, N/V/D,    PAST MEDICAL & SURGICAL HISTORY:  High cholesterol        PHYSICAL EXAM  Vital Signs Last 24 Hrs  T(C): 36 (22 Jun 2021 05:31), Max: 36.6 (21 Jun 2021 15:28)  T(F): 96.8 (22 Jun 2021 05:31), Max: 97.9 (21 Jun 2021 15:28)  HR: 69 (22 Jun 2021 05:31) (59 - 70)  BP: 112/69 (22 Jun 2021 05:31) (101/51 - 161/87)  BP(mean): --  RR: 18 (22 Jun 2021 05:31) (13 - 18)  SpO2: 99% (22 Jun 2021 05:31) (96% - 99%)    I&O's Summary    GENERAL: NAD, well-developed  HEAD:  Atraumatic, Normocephalic  EYES: EOMI, PERRLA, conjunctiva and sclera clear  NECK: Supple, No JVD  CHEST/LUNG: Clear to auscultation bilaterally; No wheeze, rhonchi or rales  HEART: Regular rate and rhythm; No murmurs, rubs, or gallops  ABDOMEN: Soft, Nontender, Nondistended; Bowel sounds present  EXTREMITIES:  2+ Peripheral Pulses, No clubbing, cyanosis, or edema  PSYCH: AAOx3  NEUROLOGY: dizziness and nausea while sitting  SKIN: No rashes or lesions      LABS                        13.7   10.13 )-----------( 349      ( 21 Jun 2021 10:09 )             41.3     Hemoglobin: 13.7 g/dL (06-21 @ 10:09)    CBC Full  -  ( 21 Jun 2021 10:09 )  WBC Count : 10.13 K/uL  RBC Count : 4.41 M/uL  Hemoglobin : 13.7 g/dL  Hematocrit : 41.3 %  Platelet Count - Automated : 349 K/uL  Mean Cell Volume : 93.7 fL  Mean Cell Hemoglobin : 31.1 pg  Mean Cell Hemoglobin Concentration : 33.2 g/dL  Auto Neutrophil # : 7.42 K/uL  Auto Lymphocyte # : 1.74 K/uL  Auto Monocyte # : 0.75 K/uL  Auto Eosinophil # : 0.11 K/uL  Auto Basophil # : 0.04 K/uL  Auto Neutrophil % : 73.2 %  Auto Lymphocyte % : 17.2 %  Auto Monocyte % : 7.4 %  Auto Eosinophil % : 1.1 %  Auto Basophil % : 0.4 %    06-21    137  |  103  |  12  ----------------------------<  105<H>  3.9   |  24  |  0.6<L>    Ca    9.1      21 Jun 2021 10:09    TPro  6.7  /  Alb  4.3  /  TBili  0.5  /  DBili  x   /  AST  20  /  ALT  18  /  AlkPhos  95  06-21    Creatinine Trend: 0.6<--  LIVER FUNCTIONS - ( 21 Jun 2021 10:09 )  Alb: 4.3 g/dL / Pro: 6.7 g/dL / ALK PHOS: 95 U/L / ALT: 18 U/L / AST: 20 U/L / GGT: x           PT/INR - ( 21 Jun 2021 10:09 )   PT: 11.00 sec;   INR: 0.96 ratio         PTT - ( 21 Jun 2021 10:09 )  PTT:30.0 sec  CARDIAC MARKERS ( 21 Jun 2021 10:09 )  x     / <0.01 ng/mL / x     / x     / x          
Pt downgraded from stroke unit where she was under neurology care    Patient is a 70y old  Female who presents with a chief complaint of   INTERVAL HPI/OVERNIGHT EVENTS: Patient was examined and seen at bedside. This morning pt is resting comfortably in bed and reports no new issues or overnight events. No complaints, feels back to baseline  ROS: Denies CP, SOB, AP, new weakness  All other systems reviewed and are within normal limits.  InitialHPI:  71 y/o F w/ PMHx of HLD presents with dizziness, left sided numbness and weakness. Patient woke up this morning with dizziness and sensation of room spinning. She tried to get up and walk to the washroom however she was unable to stand properly. She called her  and was trying to talk to him however reported having difficulty with speech. Additionally she endorsed left sided numbness/tingling along with weakness. Symptoms lasted roughly 15 minutes and currently in the ED she reports feeling back to normal.  (21 Jun 2021 16:51)    PAST MEDICAL & SURGICAL HISTORY:  High cholesterol        General: NAD, AAO3  HEENT:  EOMI, no LAD  CV: S1 S2  Resp: decreased breath sounds at bases  GI: NT/ND/S +BS  MS: no clubbing/cyanosis/edema, + pulses b/l  Neuro: nonfocal, +reflexes thruout    MEDICATIONS  (STANDING):  aspirin  chewable 81 milliGRAM(s) Oral daily  atorvastatin 80 milliGRAM(s) Oral at bedtime  chlorhexidine 4% Liquid 1 Application(s) Topical <User Schedule>  enoxaparin Injectable 40 milliGRAM(s) SubCutaneous daily    MEDICATIONS  (PRN):  meclizine 25 milliGRAM(s) Oral every 8 hours PRN Dizziness    Vital Signs Last 24 Hrs  T(C): 36.1 (23 Jun 2021 13:54), Max: 36.4 (22 Jun 2021 20:42)  T(F): 97 (23 Jun 2021 13:54), Max: 97.5 (22 Jun 2021 20:42)  HR: 73 (23 Jun 2021 13:54) (55 - 73)  BP: 132/74 (23 Jun 2021 13:54) (112/64 - 146/77)  BP(mean): --  RR: 18 (23 Jun 2021 05:00) (18 - 18)  SpO2: --  CAPILLARY BLOOD GLUCOSE                                  Chart, Consultant(s) Notes Reviewed:  [x ] YES  [ ] NO  Care Discussed with Consultants/Other Providers/ Housestaff [ x] YES  [ ] NO  Radiology, labs, old available records personally reviewed.

## 2021-06-24 PROBLEM — E78.00 PURE HYPERCHOLESTEROLEMIA, UNSPECIFIED: Chronic | Status: ACTIVE | Noted: 2021-06-21

## 2021-06-24 PROBLEM — Z00.00 ENCOUNTER FOR PREVENTIVE HEALTH EXAMINATION: Status: ACTIVE | Noted: 2021-06-24

## 2021-06-30 DIAGNOSIS — Z20.822 CONTACT WITH AND (SUSPECTED) EXPOSURE TO COVID-19: ICD-10-CM

## 2021-06-30 DIAGNOSIS — E78.5 HYPERLIPIDEMIA, UNSPECIFIED: ICD-10-CM

## 2021-06-30 DIAGNOSIS — R42 DIZZINESS AND GIDDINESS: ICD-10-CM

## 2021-06-30 DIAGNOSIS — G45.9 TRANSIENT CEREBRAL ISCHEMIC ATTACK, UNSPECIFIED: ICD-10-CM

## 2021-07-23 ENCOUNTER — APPOINTMENT (OUTPATIENT)
Dept: NEUROLOGY | Facility: CLINIC | Age: 71
End: 2021-07-23

## 2021-12-22 ENCOUNTER — OUTPATIENT (OUTPATIENT)
Dept: OUTPATIENT SERVICES | Facility: HOSPITAL | Age: 71
LOS: 1 days | Discharge: HOME | End: 2021-12-22
Payer: MEDICARE

## 2021-12-22 DIAGNOSIS — Z12.31 ENCOUNTER FOR SCREENING MAMMOGRAM FOR MALIGNANT NEOPLASM OF BREAST: ICD-10-CM

## 2021-12-22 PROCEDURE — 77067 SCR MAMMO BI INCL CAD: CPT | Mod: 26

## 2021-12-22 PROCEDURE — 77063 BREAST TOMOSYNTHESIS BI: CPT | Mod: 26

## 2022-02-05 ENCOUNTER — EMERGENCY (EMERGENCY)
Facility: HOSPITAL | Age: 72
LOS: 0 days | Discharge: HOME | End: 2022-02-05
Attending: STUDENT IN AN ORGANIZED HEALTH CARE EDUCATION/TRAINING PROGRAM | Admitting: STUDENT IN AN ORGANIZED HEALTH CARE EDUCATION/TRAINING PROGRAM
Payer: MEDICARE

## 2022-02-05 VITALS
RESPIRATION RATE: 17 BRPM | WEIGHT: 130.07 LBS | SYSTOLIC BLOOD PRESSURE: 199 MMHG | HEART RATE: 89 BPM | OXYGEN SATURATION: 100 % | TEMPERATURE: 98 F | DIASTOLIC BLOOD PRESSURE: 88 MMHG
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
## 2022-02-05 DIAGNOSIS — Z79.82 LONG TERM (CURRENT) USE OF ASPIRIN: ICD-10-CM
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
## 2022-02-05 DIAGNOSIS — S52.531A COLLES' FRACTURE OF RIGHT RADIUS, INITIAL ENCOUNTER FOR CLOSED FRACTURE: ICD-10-CM

## 2022-02-05 DIAGNOSIS — M25.531 PAIN IN RIGHT WRIST: ICD-10-CM

## 2022-02-05 DIAGNOSIS — E78.00 PURE HYPERCHOLESTEROLEMIA, UNSPECIFIED: ICD-10-CM
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
## 2022-02-05 DIAGNOSIS — E78.5 HYPERLIPIDEMIA, UNSPECIFIED: ICD-10-CM

## 2022-02-05 PROCEDURE — 73090 X-RAY EXAM OF FOREARM: CPT | Mod: 26,RT

## 2022-02-05 PROCEDURE — 99284 EMERGENCY DEPT VISIT MOD MDM: CPT | Mod: 25,GC

## 2022-02-05 PROCEDURE — 73110 X-RAY EXAM OF WRIST: CPT | Mod: 26,RT

## 2022-02-05 PROCEDURE — 29125 APPL SHORT ARM SPLINT STATIC: CPT | Mod: GC

## 2022-02-05 PROCEDURE — 73120 X-RAY EXAM OF HAND: CPT | Mod: 26,RT

## 2022-02-05 NOTE — ED ADULT NURSE NOTE - OBJECTIVE STATEMENT
71 year old female complaining of right wrist pain, bruising, swelling. Pt states she was walking her dog and the dog pulled her causing her to fall onto her wrist on the sidewalk. Pt  states she is unable to bend her wrist but can move her R fingers. R Wrist is wrapped in ACE bandage. Pt last took ibuprofen around 9pm.

## 2022-02-05 NOTE — ED PROVIDER NOTE - CARE PROVIDER_API CALL
Dante Valentine)  Orthopaedic Surgery  [address] [place] [address]  Phone: [phone]  Fax: [phone]  Follow Up Time: 1-3 Days

## 2022-02-05 NOTE — ED PROVIDER NOTE - PROGRESS NOTE DETAILS
Authored by Shivani Webber, DO: Sugartong splint placed, ortho follow up given. Patient to be discharged from ED. Any available test results were discussed with patient and/or family. Verbal instructions given, including instructions to return to ED immediately for any new, worsening, or concerning symptoms. Patient endorsed understanding. Written discharge instructions additionally given, including follow-up plan.

## 2022-02-05 NOTE — ED PROVIDER NOTE - CLINICAL SUMMARY MEDICAL DECISION MAKING FREE TEXT BOX
I personally evaluated the patient. I reviewed the Resident´s or Physician Assistant´s note (as assigned above), and agree with the findings and plan except as documented in my note.  Patient evaluated for wrist pain status post fall.  X-rays performed in the ED, patient neurovascularly intact.  Splint placed for acute fracture and instructed to follow-up with Ortho.  I have fully discussed the medical management and delivery of care with the patient. I have discussed any available labs, imaging and treatment options with the patient. Patient confirms understanding and has been given detailed return precautions. Patient instructed to return to the ED should symptoms persist or worsen. Patient has demonstrated capacity and has verbalized understanding. Patient is well appearing upon discharge.

## 2022-02-05 NOTE — ED PROVIDER NOTE - OBJECTIVE STATEMENT
72 y/o F PMHx HLD on baby ASA presents to ED with R arm injury. Pt reports 2 hours PTA she was walking dog and the dog pulled and she fell landing on outstretched hand. No HT or LOC. Reporting right wrist pain. No numbness or weakness.

## 2022-02-05 NOTE — ED PROVIDER NOTE - NS ED ROS FT
Review of Systems:  CONSTITUTIONAL: No fever   SKIN: No rash  HEMATOLOGIC: No abnormal bleeding   EYES: No eye pain, No blurred vision  ENT: No neck pain, No rhinorrhea   RESPIRATORY: No shortness of breath, No cough  CARDIAC: No chest pain, No palpitations  GI: No abdominal pain, No nausea, No vomiting   MUSCULOSKELETAL: +joint paint, No swelling, No back pain  NEUROLOGIC: No numbness, No focal weakness, No headache, No dizziness  All other systems negative, unless specified in HPI

## 2022-02-05 NOTE — ED PROVIDER NOTE - ATTENDING CONTRIBUTION TO CARE
71-year-old female past medical history of hyperlipidemia presents with right arm pain.  2 hours prior to arrival patient was walking her dog and got pulled forward, landed on right arm.  Complaining of right wrist pain.  No head trauma, no LOC, no AC use, no neck pain, no other extremity pain, no numbness/tingling, no decreased range of motion.    CONSTITUTIONAL: Well-developed; well-nourished; in no acute distress. Sitting up and providing appropriate history and physical examination  SKIN: skin exam is warm and dry, no acute rash.  HEAD: Normocephalic; atraumatic.  EYES: PERRL, 3 mm bilateral, no nystagmus, EOM intact; conjunctiva and sclera clear.  ENT: No nasal discharge; airway clear.  NECK: Supple; non tender. + full passive ROM in all directions. No JVD  EXT: + TTP over right wrist.  Distal radial pulses intact and equal bilaterally.  Normal ROM. No clubbing, cyanosis or edema. Dp and Pt Pulses intact. Cap refill less than 3 seconds  NEURO: CN 2-12 intact, normal finger to nose, normal romberg, stable gait, no sensory or motor deficits, Alert, oriented, grossly unremarkable. No Focal deficits. GCS 15. NIH 0  PSYCH: Cooperative, appropriate.

## 2022-02-05 NOTE — ED PROVIDER NOTE - PHYSICAL EXAMINATION
CONSTITUTIONAL: Well-developed; well-nourished; in no acute distress.   SKIN: warm, dry  HEAD: Normocephalic; atraumatic.  EYES: no conjunctival injection.  EOMI.   ENT: No nasal discharge; airway clear.  NECK: Supple   CARD:  RRR  RESP: No accessory muscle use. No increased WOB.   ABD: soft ntnd.   EXT: +deformity with swelling at right distal radius, +TTP, distal pulses intact, no snuffbox TTP  LYMPH: No acute cervical adenopathy.  NEURO: Alert, oriented, grossly unremarkable. Sensation intact throughout.   PSYCH: Cooperative, appropriate.

## 2022-02-05 NOTE — ED PROVIDER NOTE - PATIENT PORTAL LINK FT
You can access the FollowMyHealth Patient Portal offered by [facility] by registering at the following website: [email or web address]. By joining [employer]’s FollowMyHealth portal, you will also be able to view your health information using other applications (apps) compatible with our system.

## 2022-03-02 ENCOUNTER — OUTPATIENT (OUTPATIENT)
Dept: OUTPATIENT SERVICES | Facility: HOSPITAL | Age: 72
LOS: 1 days | Discharge: HOME | End: 2022-03-02
Payer: MEDICARE

## 2022-03-02 VITALS
RESPIRATION RATE: 18 BRPM | HEIGHT: 62 IN | TEMPERATURE: 96 F | WEIGHT: 149.91 LBS | DIASTOLIC BLOOD PRESSURE: 81 MMHG | HEART RATE: 95 BPM | OXYGEN SATURATION: 98 % | SYSTOLIC BLOOD PRESSURE: 131 MMHG

## 2022-03-02 DIAGNOSIS — S52.551D OTHER EXTRAARTICULAR FRACTURE OF LOWER END OF RIGHT RADIUS, SUBSEQUENT ENCOUNTER FOR CLOSED FRACTURE WITH ROUTINE HEALING: ICD-10-CM

## 2022-03-02 DIAGNOSIS — Z01.818 ENCOUNTER FOR OTHER PREPROCEDURAL EXAMINATION: ICD-10-CM

## 2022-03-02 LAB
ANION GAP SERPL CALC-SCNC: 14 MMOL/L — SIGNIFICANT CHANGE UP (ref 7–14)
APPEARANCE UR: CLEAR — SIGNIFICANT CHANGE UP
BACTERIA # UR AUTO: NEGATIVE — SIGNIFICANT CHANGE UP
BASOPHILS # BLD AUTO: 0.05 K/UL — SIGNIFICANT CHANGE UP (ref 0–0.2)
BASOPHILS NFR BLD AUTO: 0.4 % — SIGNIFICANT CHANGE UP (ref 0–1)
BILIRUB UR-MCNC: NEGATIVE — SIGNIFICANT CHANGE UP
BUN SERPL-MCNC: 12 MG/DL — SIGNIFICANT CHANGE UP (ref 10–20)
CALCIUM SERPL-MCNC: 9.4 MG/DL — SIGNIFICANT CHANGE UP (ref 8.5–10.1)
CHLORIDE SERPL-SCNC: 103 MMOL/L — SIGNIFICANT CHANGE UP (ref 98–110)
CO2 SERPL-SCNC: 22 MMOL/L — SIGNIFICANT CHANGE UP (ref 17–32)
COLOR SPEC: YELLOW — SIGNIFICANT CHANGE UP
CREAT SERPL-MCNC: 0.7 MG/DL — SIGNIFICANT CHANGE UP (ref 0.7–1.5)
DIFF PNL FLD: ABNORMAL
EGFR: 92 ML/MIN/1.73M2 — SIGNIFICANT CHANGE UP
EOSINOPHIL # BLD AUTO: 0.14 K/UL — SIGNIFICANT CHANGE UP (ref 0–0.7)
EOSINOPHIL NFR BLD AUTO: 1.2 % — SIGNIFICANT CHANGE UP (ref 0–8)
EPI CELLS # UR: 1 /HPF — SIGNIFICANT CHANGE UP (ref 0–5)
GLUCOSE SERPL-MCNC: 80 MG/DL — SIGNIFICANT CHANGE UP (ref 70–99)
GLUCOSE UR QL: NEGATIVE — SIGNIFICANT CHANGE UP
HCT VFR BLD CALC: 39.1 % — SIGNIFICANT CHANGE UP (ref 37–47)
HGB BLD-MCNC: 13.1 G/DL — SIGNIFICANT CHANGE UP (ref 12–16)
HYALINE CASTS # UR AUTO: 2 /LPF — SIGNIFICANT CHANGE UP (ref 0–7)
IMM GRANULOCYTES NFR BLD AUTO: 0.4 % — HIGH (ref 0.1–0.3)
KETONES UR-MCNC: NEGATIVE — SIGNIFICANT CHANGE UP
LEUKOCYTE ESTERASE UR-ACNC: ABNORMAL
LYMPHOCYTES # BLD AUTO: 28.8 % — SIGNIFICANT CHANGE UP (ref 20.5–51.1)
LYMPHOCYTES # BLD AUTO: 3.41 K/UL — HIGH (ref 1.2–3.4)
MCHC RBC-ENTMCNC: 31.6 PG — HIGH (ref 27–31)
MCHC RBC-ENTMCNC: 33.5 G/DL — SIGNIFICANT CHANGE UP (ref 32–37)
MCV RBC AUTO: 94.2 FL — SIGNIFICANT CHANGE UP (ref 81–99)
MONOCYTES # BLD AUTO: 1.05 K/UL — HIGH (ref 0.1–0.6)
MONOCYTES NFR BLD AUTO: 8.9 % — SIGNIFICANT CHANGE UP (ref 1.7–9.3)
NEUTROPHILS # BLD AUTO: 7.15 K/UL — HIGH (ref 1.4–6.5)
NEUTROPHILS NFR BLD AUTO: 60.3 % — SIGNIFICANT CHANGE UP (ref 42.2–75.2)
NITRITE UR-MCNC: NEGATIVE — SIGNIFICANT CHANGE UP
NRBC # BLD: 0 /100 WBCS — SIGNIFICANT CHANGE UP (ref 0–0)
PH UR: 6.5 — SIGNIFICANT CHANGE UP (ref 5–8)
PLATELET # BLD AUTO: 407 K/UL — HIGH (ref 130–400)
POTASSIUM SERPL-MCNC: 4.4 MMOL/L — SIGNIFICANT CHANGE UP (ref 3.5–5)
POTASSIUM SERPL-SCNC: 4.4 MMOL/L — SIGNIFICANT CHANGE UP (ref 3.5–5)
PROT UR-MCNC: SIGNIFICANT CHANGE UP
RBC # BLD: 4.15 M/UL — LOW (ref 4.2–5.4)
RBC # FLD: 13.2 % — SIGNIFICANT CHANGE UP (ref 11.5–14.5)
RBC CASTS # UR COMP ASSIST: 1 /HPF — SIGNIFICANT CHANGE UP (ref 0–4)
SODIUM SERPL-SCNC: 139 MMOL/L — SIGNIFICANT CHANGE UP (ref 135–146)
SP GR SPEC: 1.02 — SIGNIFICANT CHANGE UP (ref 1.01–1.03)
UROBILINOGEN FLD QL: SIGNIFICANT CHANGE UP
WBC # BLD: 11.85 K/UL — HIGH (ref 4.8–10.8)
WBC # FLD AUTO: 11.85 K/UL — HIGH (ref 4.8–10.8)
WBC UR QL: 3 /HPF — SIGNIFICANT CHANGE UP (ref 0–5)

## 2022-03-02 PROCEDURE — 93010 ELECTROCARDIOGRAM REPORT: CPT

## 2022-03-02 NOTE — H&P PST ADULT - NSANTHOSAYNRD_GEN_A_CORE
No. NELSON screening performed.  STOP BANG Legend: 0-2 = LOW Risk; 3-4 = INTERMEDIATE Risk; 5-8 = HIGH Risk

## 2022-03-02 NOTE — H&P PST ADULT - HISTORY OF PRESENT ILLNESS
70 yo f here for past. pt sched for rt wrist ORIF on 3/9/2022 with dr. farfan under regional anesthesia in [place]    Pt reports no cardiopulmonary issues denies sob/villalobos/cp/palpitations. Pt states no recent infections no fever no cough no uti uri. Stated exercise tolerance is   1-2  flights no changes. Deepak screen revd.  pt verbalized and understanding of instructions  given and all concerns and questions asked and answered.  Pt denies any s/s covid 19 and reports no contact with known positive people. Pt has appointment for repeat covid testing pre op and instructed to continue to self monitor and report any concerns to MD. Pt will continue to practice self isolation and  exposure control measures pre op  Anesthesia Alert  NO--Difficult Airway  NO--History of neck surgery or radiation  NO--Limited ROM of neck  NO--History of Malignant hyperthermia  NO--No personal or family history of Pseudocholinesterase deficiency.  NO--Prior Anesthesia Complication  NO--Latex Allergy  NO--Loose teeth  NO--History of Rheumatoid Arthritis  NO--DEEPAK  NO--Other_____  written and verbal instructions with teach back on chlorhexidine shampoo provided,  pt verbalized understanding with returned demonstration  PT DENIES ANY RASHES, ABRASION, OR OPEN WOUNDS OR CUTS  denies travel outside the [place] in the past 30 days

## 2022-03-08 NOTE — ASU PATIENT PROFILE, ADULT - FALL HARM RISK - RISK INTERVENTIONS

## 2022-03-08 NOTE — ASU PATIENT PROFILE, ADULT - NSICDXPASTSURGICALHX_GEN_ALL_CORE_FT
PAST SURGICAL HISTORY:  S/p bilateral revision of total hip replacement     S/P excision of lipoma left arm    S/P hysterectomy

## 2022-03-09 ENCOUNTER — OUTPATIENT (OUTPATIENT)
Dept: OUTPATIENT SERVICES | Facility: HOSPITAL | Age: 72
LOS: 1 days | Discharge: HOME | End: 2022-03-09
Payer: MEDICARE

## 2022-03-09 VITALS
RESPIRATION RATE: 18 BRPM | HEART RATE: 64 BPM | SYSTOLIC BLOOD PRESSURE: 124 MMHG | HEIGHT: 62 IN | WEIGHT: 149.91 LBS | OXYGEN SATURATION: 97 % | TEMPERATURE: 98 F | DIASTOLIC BLOOD PRESSURE: 69 MMHG

## 2022-03-09 VITALS
DIASTOLIC BLOOD PRESSURE: 82 MMHG | SYSTOLIC BLOOD PRESSURE: 140 MMHG | HEART RATE: 68 BPM | OXYGEN SATURATION: 95 % | RESPIRATION RATE: 22 BRPM

## 2022-03-09 DIAGNOSIS — Z96.643 PRESENCE OF ARTIFICIAL HIP JOINT, BILATERAL: Chronic | ICD-10-CM

## 2022-03-09 DIAGNOSIS — Z98.890 OTHER SPECIFIED POSTPROCEDURAL STATES: Chronic | ICD-10-CM

## 2022-03-09 DIAGNOSIS — Z90.710 ACQUIRED ABSENCE OF BOTH CERVIX AND UTERUS: Chronic | ICD-10-CM

## 2022-03-09 LAB
CK MB CFR SERPL CALC: 3.2 NG/ML — SIGNIFICANT CHANGE UP (ref 0.6–6.3)
CK SERPL-CCNC: 85 U/L — SIGNIFICANT CHANGE UP (ref 0–225)
TROPONIN T SERPL-MCNC: <0.01 NG/ML — SIGNIFICANT CHANGE UP

## 2022-03-09 PROCEDURE — 93010 ELECTROCARDIOGRAM REPORT: CPT

## 2022-03-09 RX ORDER — ONDANSETRON 8 MG/1
4 TABLET, FILM COATED ORAL ONCE
Refills: 0 | Status: DISCONTINUED | OUTPATIENT
Start: 2022-03-09 | End: 2022-03-23

## 2022-03-09 RX ORDER — IBUPROFEN 200 MG
1 TABLET ORAL
Qty: 20 | Refills: 0
Start: 2022-03-09

## 2022-03-09 RX ORDER — OXYCODONE AND ACETAMINOPHEN 5; 325 MG/1; MG/1
1 TABLET ORAL EVERY 4 HOURS
Refills: 0 | Status: DISCONTINUED | OUTPATIENT
Start: 2022-03-09 | End: 2022-03-09

## 2022-03-09 RX ORDER — SODIUM CHLORIDE 9 MG/ML
1000 INJECTION, SOLUTION INTRAVENOUS
Refills: 0 | Status: DISCONTINUED | OUTPATIENT
Start: 2022-03-09 | End: 2022-03-23

## 2022-03-09 RX ADMIN — SODIUM CHLORIDE 100 MILLILITER(S): 9 INJECTION, SOLUTION INTRAVENOUS at 12:37

## 2022-03-09 NOTE — CHART NOTE - NSCHARTNOTEFT_GEN_A_CORE
PACU ANESTHESIA ADMISSION NOTE      Procedure: ORIF, 3-part fracture, radius, distal, intra-articular      Post op diagnosis:  Oth intartic fracture of lower end of right radius, init        ____  Intubated  TV:______       Rate: ______      FiO2: ______    _x___  Patent Airway    ____  Full return of protective reflexes    ____  Full recovery from anesthesia / back to baseline status    Vitals:  T(C): 36.9   HR: 64   BP: 124/69   RR: 18   SpO2: 97%     Mental Status:  _x___ Awake   _____ Alert   _____ Drowsy   _____ Sedated    Nausea/Vomiting:  ____ Yes, See Post - Op Orders      __x__ No    Pain Scale (0-10):  _____    Treatment: ____ None    ___x_ See Post - Op/PCA Orders    Post - Operative Fluids:   ____ Oral   _x___ See Post - Op Orders    Plan: Discharge:   __x__Home       _____Floor     _____Critical Care    _____  Other:_________________    Comments:  report given to RN DC to pacu

## 2022-03-09 NOTE — ASU DISCHARGE PLAN (ADULT/PEDIATRIC) - NS MD DC FALL RISK RISK
For information on Fall & Injury Prevention, visit: [email or web address] OR  [email or web address] OR  https://www.cdc.gov/steadi/patient.html

## 2022-03-09 NOTE — BRIEF OPERATIVE NOTE - NSICDXBRIEFPROCEDURE_GEN_ALL_CORE_FT
PROCEDURES:  ORIF, 3-part fracture, radius, distal, intra-articular 09-Mar-2022 12:21:10  Dante Valentine

## 2022-03-09 NOTE — BRIEF OPERATIVE NOTE - NSICDXBRIEFPREOP_GEN_ALL_CORE_FT
PRE-OP DIAGNOSIS:  Closed intra-articular die-punch fracture of right radius 09-Mar-2022 12:21:21  Dante Valentine

## 2022-03-09 NOTE — BRIEF OPERATIVE NOTE - NSICDXBRIEFPOSTOP_GEN_ALL_CORE_FT
POST-OP DIAGNOSIS:  Oth intartic fracture of lower end of right radius, init 09-Mar-2022 12:21:54  Dante Valentine

## 2022-03-15 DIAGNOSIS — S52.571A OTHER INTRAARTICULAR FRACTURE OF LOWER END OF RIGHT RADIUS, INITIAL ENCOUNTER FOR CLOSED FRACTURE: ICD-10-CM

## 2022-03-15 DIAGNOSIS — Z79.82 LONG TERM (CURRENT) USE OF ASPIRIN: ICD-10-CM

## 2022-03-15 DIAGNOSIS — E78.00 PURE HYPERCHOLESTEROLEMIA, UNSPECIFIED: ICD-10-CM
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
## 2022-06-21 ENCOUNTER — APPOINTMENT (OUTPATIENT)
Dept: ORTHOPEDIC SURGERY | Facility: CLINIC | Age: 72
End: 2022-06-21
Payer: MEDICARE

## 2022-06-21 DIAGNOSIS — S52.571D OTHER INTRAARTICULAR FRACTURE OF LOWER END OF RIGHT RADIUS, SUBSEQUENT ENCOUNTER FOR CLOSED FRACTURE WITH ROUTINE HEALING: ICD-10-CM

## 2022-06-21 PROCEDURE — 73110 X-RAY EXAM OF WRIST: CPT | Mod: RT

## 2022-06-21 PROCEDURE — 99213 OFFICE O/P EST LOW 20 MIN: CPT

## 2022-06-21 NOTE — ASSESSMENT
[FreeTextEntry1] :   Patient is doing well status post internal fixation of a right wrist fracture.  She will see me back on an as-needed basis.  She is moving to [place] is also spending time in [place] any other issues or problems are contact the office back aching ulnar-sided wrist pain was discussed with the patient at length.  I did wrap her into Coban wrap today she felt better with that she may try a wrist widget brace.

## 2022-06-21 NOTE — PHYSICAL EXAM
[de-identified] :   Patient is well-healed surgical skin incision.  She does have some tenderness to palpation on the ulnar side of the wrist.  There is good rotation of the forearm good flexion-extension of the wrist there is normal sensation normal capillary refill.  There is no swelling erythema ecchymoses or abrasions

## 2022-06-21 NOTE — HISTORY OF PRESENT ILLNESS
[de-identified] : 71-year-old female status post internal fixation of a right wrist fracture.  He is doing well.  Id she did a therapy program here and [place].  She is feeling much better.  Id less pain less discomfort and better function she still having some pain mainly on the ulnar side of the wrist.

## 2022-06-21 NOTE — HISTORY OF PRESENT ILLNESS
[de-identified] : 71-year-old female status post internal fixation of a right wrist fracture.  He is doing well.  Id she did a therapy program here and [place].  She is feeling much better.  Id less pain less discomfort and better function she still having some pain mainly on the ulnar side of the wrist.

## 2022-06-21 NOTE — DATA REVIEWED
[FreeTextEntry1] :  Three views of the right wrist reviewed showing good position alignment and healing of a right intra-articular distal radius fracture hardware is in good position.

## 2022-06-21 NOTE — PHYSICAL EXAM
[de-identified] :   Patient is well-healed surgical skin incision.  She does have some tenderness to palpation on the ulnar side of the wrist.  There is good rotation of the forearm good flexion-extension of the wrist there is normal sensation normal capillary refill.  There is no swelling erythema ecchymoses or abrasions

## 2022-06-21 NOTE — ASSESSMENT
[FreeTextEntry1] :   Patient is doing well status post internal fixation of a right wrist fracture.  She will see me back on an as-needed basis.  She is moving to [place] is also spending time in [place] any other issues or problems are contact the office back aching ulnar-sided wrist pain was discussed with the patient at length.  I did wrap her into Coban wrap today she felt better with that she may try a wrist widget brace.

## 2022-07-28 NOTE — ED ADULT NURSE NOTE - NSFALLRSKINDICATORS_ED_ALL_ED
Initiate Treatment: terbinafine HCl 250 mg tablet Detail Level: Zone Render In Strict Bullet Format?: No yes

## 2023-03-24 NOTE — ASU DISCHARGE PLAN (ADULT/PEDIATRIC) - CARE PROVIDER_API CALL
4
Dante Valentine)  Orthopaedic Surgery  [address] [place] [address]  Phone: [phone]  Fax: [phone]  Follow Up Time:
